# Patient Record
Sex: FEMALE | URBAN - METROPOLITAN AREA
[De-identification: names, ages, dates, MRNs, and addresses within clinical notes are randomized per-mention and may not be internally consistent; named-entity substitution may affect disease eponyms.]

---

## 2018-02-28 ENCOUNTER — NEW PATIENT (OUTPATIENT)
Dept: URBAN - METROPOLITAN AREA CLINIC 10 | Facility: CLINIC | Age: 63
End: 2018-02-28
Payer: MEDICARE

## 2018-02-28 PROCEDURE — 92002 INTRM OPH EXAM NEW PATIENT: CPT | Performed by: OPTOMETRIST

## 2018-02-28 RX ORDER — PREDNISOLONE ACETATE 10 MG/ML
1 % SUSPENSION/ DROPS OPHTHALMIC
Qty: 1 | Refills: 1 | Status: INACTIVE
Start: 2018-02-28 | End: 2018-03-06

## 2018-03-06 ENCOUNTER — FOLLOW UP ESTABLISHED (OUTPATIENT)
Dept: URBAN - METROPOLITAN AREA CLINIC 10 | Facility: CLINIC | Age: 63
End: 2018-03-06
Payer: MEDICARE

## 2018-03-06 DIAGNOSIS — H20.043 SECONDARY NONINFECTIOUS IRIDOCYCLITIS, BILATERAL: ICD-10-CM

## 2018-03-06 PROCEDURE — 92014 COMPRE OPH EXAM EST PT 1/>: CPT | Performed by: OPTOMETRIST

## 2018-03-06 ASSESSMENT — VISUAL ACUITY
OS: 20/25
OD: 20/20

## 2018-03-06 ASSESSMENT — KERATOMETRY
OD: 44.75
OS: 44.63

## 2018-03-06 ASSESSMENT — INTRAOCULAR PRESSURE
OD: 19
OS: 17

## 2018-03-26 ENCOUNTER — FOLLOW UP ESTABLISHED (OUTPATIENT)
Dept: URBAN - METROPOLITAN AREA CLINIC 10 | Facility: CLINIC | Age: 63
End: 2018-03-26
Payer: MEDICARE

## 2018-03-26 DIAGNOSIS — H25.813 COMBINED FORMS OF AGE-RELATED CATARACT, BILATERAL: ICD-10-CM

## 2018-03-26 PROCEDURE — 92012 INTRM OPH EXAM EST PATIENT: CPT | Performed by: OPTOMETRIST

## 2018-03-26 ASSESSMENT — INTRAOCULAR PRESSURE
OD: 17
OS: 17

## 2019-09-18 ENCOUNTER — OFFICE VISIT (OUTPATIENT)
Dept: URBAN - METROPOLITAN AREA CLINIC 33 | Facility: CLINIC | Age: 64
End: 2019-09-18
Payer: COMMERCIAL

## 2019-09-18 DIAGNOSIS — H20.013 PRIMARY IRIDOCYCLITIS, BILATERAL: ICD-10-CM

## 2019-09-18 DIAGNOSIS — H25.13 AGE-RELATED NUCLEAR CATARACT, BILATERAL: Primary | ICD-10-CM

## 2019-09-18 DIAGNOSIS — H52.4 PRESBYOPIA: ICD-10-CM

## 2019-09-18 PROCEDURE — 99203 OFFICE O/P NEW LOW 30 MIN: CPT | Performed by: OPTOMETRIST

## 2019-09-18 RX ORDER — LOTEPREDNOL ETABONATE 3.8 MG/G
0.38 % GEL OPHTHALMIC
Qty: 1 | Refills: 1 | Status: ACTIVE
Start: 2019-09-18

## 2019-09-18 ASSESSMENT — INTRAOCULAR PRESSURE
OS: 14
OD: 14

## 2019-09-18 ASSESSMENT — KERATOMETRY
OD: 44.63
OS: 44.25

## 2019-09-18 ASSESSMENT — VISUAL ACUITY
OS: 20/25
OD: 20/25

## 2019-09-18 NOTE — IMPRESSION/PLAN
Impression: Primary iridocyclitis, bilateral: H20.013. Plan: low grade inflammation OD>OS from auto-immune condition. 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 

Start Lotemax BID OU